# Patient Record
Sex: MALE | Race: WHITE | NOT HISPANIC OR LATINO | Employment: FULL TIME | URBAN - METROPOLITAN AREA
[De-identification: names, ages, dates, MRNs, and addresses within clinical notes are randomized per-mention and may not be internally consistent; named-entity substitution may affect disease eponyms.]

---

## 2017-02-06 ENCOUNTER — OFFICE VISIT (OUTPATIENT)
Dept: SURGERY | Facility: CLINIC | Age: 52
End: 2017-02-06
Payer: COMMERCIAL

## 2017-02-06 VITALS
SYSTOLIC BLOOD PRESSURE: 120 MMHG | HEART RATE: 77 BPM | BODY MASS INDEX: 30.07 KG/M2 | DIASTOLIC BLOOD PRESSURE: 80 MMHG | WEIGHT: 203 LBS | HEIGHT: 69 IN

## 2017-02-06 DIAGNOSIS — R10.31 RIGHT GROIN PAIN: Primary | ICD-10-CM

## 2017-02-06 PROCEDURE — 99214 OFFICE O/P EST MOD 30 MIN: CPT | Performed by: SURGERY

## 2017-02-06 NOTE — MR AVS SNAPSHOT
"              After Visit Summary   2/6/2017    Chris Smith    MRN: [id number]           Patient Information     Date Of Birth          1965        Visit Information        Provider Department      2/6/2017 2:45 PM Daron Scott MD [facility]       Follow-ups after your visit        Who to contact     If you have questions or need follow up information about today's clinic visit or your schedule please contact [facility] directly at [phone].  Normal or non-critical lab and imaging results will be communicated to you by [email or web address], letter or phone within 4 business days after the clinic has received the results. If you do not hear from us within 7 days, please contact the clinic through [employer] or phone. If you have a critical or abnormal lab result, we will notify you by phone as soon as possible.  Submit refill requests through [employer] or call your pharmacy and they will forward the refill request to us. Please allow 3 business days for your refill to be completed.          Additional Information About Your Visit        MyChart Information     [employer] gives you secure access to your electronic health record. If you see a primary care provider, you can also send messages to your care team and make appointments. If you have questions, please call your primary care clinic.  If you do not have a primary care provider, please call [phone] and they will assist you.        Care EveryWhere ID     This is your Care EveryWhere ID. This could be used by other organizations to access your [facility] medical records  [id number]        Your Vitals Were     Pulse Height BMI (Body Mass Index)             77 5' 9\" (1.753 m) 29.96 kg/m2          Blood Pressure from Last 3 Encounters:   02/06/17 120/80   01/18/16 109/62   12/23/15 138/90    Weight from Last 3 Encounters:   02/06/17 203 lb (92.08 kg)   01/18/16 227 lb 1.6 oz " (103.012 kg)   12/23/15 220 lb (99.791 kg)              Today, you had the following     No orders found for display       Primary Care Provider Office Phone # Fax #    Jay Malik [phone]       [address] [address]        Thank you!     Thank you for choosing [facility]  for your care. Our goal is always to provide you with excellent care. Hearing back from our patients is one way we can continue to improve our services. Please take a few minutes to complete the written survey that you may receive in the mail after your visit with us. Thank you!             Your Updated Medication List - Protect others around you: Learn how to safely use, store and throw away your medicines at www.disposemymeds.org.          This list is accurate as of: 2/6/17  3:04 PM.  Always use your most recent med list.                   Brand Name Dispense Instructions for use    CLARITIN PO      Take 10 mg by mouth daily       GLUCOSAMINE SULFATE PO      Take 1,500 mg by mouth daily       IBUPROFEN PO      Take 800 mg by mouth every 8 hours as needed for moderate pain       MULTIVITAMIN PO

## 2017-02-07 NOTE — PROGRESS NOTES
Patient presents today to discuss right-sided groin pain.  He is known to me from prior surgery for chronic groin pain related to a previous left inguinal hernia repair.  He had previously undergone laparoscopic triple neurectomy with mesh explantation and redo laparoscopic hernia repair. When he was last seen in the office in approximately last maybe we had discussed additional physical therapy which he never did. Through his own workout routine he was eventually able to resume normal activity and was completely pain-free. His activity significantly increased including weightlifting, playing hockey, riding bicycle that he was pain-free.  He lost weight and overall states that he felt better than he had in years.  Proximally two weeks ago he awoke in the morning and had sharp discomfort in his right groin.  He described this as being like someone scratching him with a wire brush.  This was quite severe for several days. This radiated across to his pubis and to a lesser degree in his left groin.  He basically ceased all activity.  This has since improved and is quite minimal at this time.  There is no radiating pain into his testicles.  There is no radiating pain into the legs. His bowel habits have been normal.    The office he was examined.  There is no evidence for recurrent hernia.    At this time I have no further recommendations.  I'm hopeful that this was a simple flareup although given his history I am somewhat pessimistic.  This was expressed to him.  I recommended a gentle reintroduction of activity.  Should he have ongoing or worsening symptoms I would recommend pain clinic evaluation. This was all expressed to him in detail.  All of his questions were answered.  Total time spent was 30 minutes with greater than 50% in face-to-face consultation.    Please route or send letter to:  Primary Care Provider (PCP)      RITESH AYON      Physical Exam

## 2018-07-02 ENCOUNTER — RECORDS - HEALTHEAST (OUTPATIENT)
Dept: LAB | Facility: CLINIC | Age: 53
End: 2018-07-02

## 2018-07-02 LAB
ANION GAP SERPL CALCULATED.3IONS-SCNC: 12 MMOL/L (ref 5–18)
BUN SERPL-MCNC: 25 MG/DL (ref 8–22)
CALCIUM SERPL-MCNC: 9.3 MG/DL (ref 8.5–10.5)
CHLORIDE BLD-SCNC: 106 MMOL/L (ref 98–107)
CHOLEST SERPL-MCNC: 223 MG/DL
CO2 SERPL-SCNC: 20 MMOL/L (ref 22–31)
CREAT SERPL-MCNC: 1.02 MG/DL (ref 0.7–1.3)
FASTING STATUS PATIENT QL REPORTED: ABNORMAL
GFR SERPL CREATININE-BSD FRML MDRD: >60 ML/MIN/1.73M2
GLUCOSE BLD-MCNC: 87 MG/DL (ref 70–125)
HDLC SERPL-MCNC: 47 MG/DL
LDLC SERPL CALC-MCNC: 129 MG/DL
POTASSIUM BLD-SCNC: ABNORMAL MMOL/L (ref 3.5–5)
PSA SERPL-MCNC: 1.2 NG/ML (ref 0–3.5)
SODIUM SERPL-SCNC: 138 MMOL/L (ref 136–145)
TRIGL SERPL-MCNC: 237 MG/DL

## 2019-08-14 ENCOUNTER — SURGERY - HEALTHEAST (OUTPATIENT)
Dept: SURGERY | Facility: HOSPITAL | Age: 54
End: 2019-08-14

## 2019-08-14 ENCOUNTER — ANESTHESIA - HEALTHEAST (OUTPATIENT)
Dept: SURGERY | Facility: HOSPITAL | Age: 54
End: 2019-08-14

## 2019-08-14 ASSESSMENT — MIFFLIN-ST. JEOR
SCORE: 1746.1

## 2019-08-15 ASSESSMENT — MIFFLIN-ST. JEOR: SCORE: 1783.36

## 2019-08-16 ASSESSMENT — MIFFLIN-ST. JEOR: SCORE: 1817.83

## 2019-08-19 ENCOUNTER — HOME CARE/HOSPICE - HEALTHEAST (OUTPATIENT)
Dept: HOME HEALTH SERVICES | Facility: HOME HEALTH | Age: 54
End: 2019-08-19

## 2019-08-19 ASSESSMENT — MIFFLIN-ST. JEOR: SCORE: 1785.62

## 2019-08-20 ENCOUNTER — AMBULATORY - HEALTHEAST (OUTPATIENT)
Dept: SURGERY | Facility: CLINIC | Age: 54
End: 2019-08-20

## 2019-08-20 ENCOUNTER — COMMUNICATION - HEALTHEAST (OUTPATIENT)
Dept: SURGERY | Facility: CLINIC | Age: 54
End: 2019-08-20

## 2019-08-21 ENCOUNTER — HOME CARE/HOSPICE - HEALTHEAST (OUTPATIENT)
Dept: HOME HEALTH SERVICES | Facility: HOME HEALTH | Age: 54
End: 2019-08-21

## 2019-08-22 ENCOUNTER — HOME CARE/HOSPICE - HEALTHEAST (OUTPATIENT)
Dept: HOME HEALTH SERVICES | Facility: HOME HEALTH | Age: 54
End: 2019-08-22

## 2019-08-23 ENCOUNTER — RECORDS - HEALTHEAST (OUTPATIENT)
Dept: ADMINISTRATIVE | Facility: OTHER | Age: 54
End: 2019-08-23

## 2019-08-23 ENCOUNTER — HOME CARE/HOSPICE - HEALTHEAST (OUTPATIENT)
Dept: HOME HEALTH SERVICES | Facility: HOME HEALTH | Age: 54
End: 2019-08-23

## 2019-08-23 ENCOUNTER — COMMUNICATION - HEALTHEAST (OUTPATIENT)
Dept: INFECTIOUS DISEASES | Facility: CLINIC | Age: 54
End: 2019-08-23

## 2019-08-24 ENCOUNTER — HOME CARE/HOSPICE - HEALTHEAST (OUTPATIENT)
Dept: HOME HEALTH SERVICES | Facility: HOME HEALTH | Age: 54
End: 2019-08-24

## 2019-08-25 ENCOUNTER — HOME CARE/HOSPICE - HEALTHEAST (OUTPATIENT)
Dept: HOME HEALTH SERVICES | Facility: HOME HEALTH | Age: 54
End: 2019-08-25

## 2019-08-26 ENCOUNTER — OFFICE VISIT - HEALTHEAST (OUTPATIENT)
Dept: SURGERY | Facility: CLINIC | Age: 54
End: 2019-08-26

## 2019-08-26 DIAGNOSIS — Z48.89 POSTOPERATIVE VISIT: ICD-10-CM

## 2019-08-26 ASSESSMENT — MIFFLIN-ST. JEOR: SCORE: 1749.79

## 2019-08-27 ENCOUNTER — HOME CARE/HOSPICE - HEALTHEAST (OUTPATIENT)
Dept: HOME HEALTH SERVICES | Facility: HOME HEALTH | Age: 54
End: 2019-08-27

## 2019-08-28 ENCOUNTER — HOME CARE/HOSPICE - HEALTHEAST (OUTPATIENT)
Dept: HOME HEALTH SERVICES | Facility: HOME HEALTH | Age: 54
End: 2019-08-28

## 2019-08-29 ENCOUNTER — HOME CARE/HOSPICE - HEALTHEAST (OUTPATIENT)
Dept: HOME HEALTH SERVICES | Facility: HOME HEALTH | Age: 54
End: 2019-08-29

## 2019-08-30 ENCOUNTER — HOME CARE/HOSPICE - HEALTHEAST (OUTPATIENT)
Dept: HOME HEALTH SERVICES | Facility: HOME HEALTH | Age: 54
End: 2019-08-30

## 2019-08-31 ENCOUNTER — HOME CARE/HOSPICE - HEALTHEAST (OUTPATIENT)
Dept: HOME HEALTH SERVICES | Facility: HOME HEALTH | Age: 54
End: 2019-08-31

## 2019-09-01 ENCOUNTER — HOME CARE/HOSPICE - HEALTHEAST (OUTPATIENT)
Dept: HOME HEALTH SERVICES | Facility: HOME HEALTH | Age: 54
End: 2019-09-01

## 2019-09-02 ENCOUNTER — HOME CARE/HOSPICE - HEALTHEAST (OUTPATIENT)
Dept: HOME HEALTH SERVICES | Facility: HOME HEALTH | Age: 54
End: 2019-09-02

## 2019-09-03 ENCOUNTER — OFFICE VISIT - HEALTHEAST (OUTPATIENT)
Dept: SURGERY | Facility: CLINIC | Age: 54
End: 2019-09-03

## 2019-09-04 ENCOUNTER — HOME CARE/HOSPICE - HEALTHEAST (OUTPATIENT)
Dept: HOME HEALTH SERVICES | Facility: HOME HEALTH | Age: 54
End: 2019-09-04

## 2019-09-05 ENCOUNTER — HOME CARE/HOSPICE - HEALTHEAST (OUTPATIENT)
Dept: HOME HEALTH SERVICES | Facility: HOME HEALTH | Age: 54
End: 2019-09-05

## 2019-09-06 ENCOUNTER — HOME CARE/HOSPICE - HEALTHEAST (OUTPATIENT)
Dept: HOME HEALTH SERVICES | Facility: HOME HEALTH | Age: 54
End: 2019-09-06

## 2019-09-07 ENCOUNTER — HOME CARE/HOSPICE - HEALTHEAST (OUTPATIENT)
Dept: HOME HEALTH SERVICES | Facility: HOME HEALTH | Age: 54
End: 2019-09-07

## 2019-09-08 ENCOUNTER — HOME CARE/HOSPICE - HEALTHEAST (OUTPATIENT)
Dept: HOME HEALTH SERVICES | Facility: HOME HEALTH | Age: 54
End: 2019-09-08

## 2019-09-09 ENCOUNTER — HOME CARE/HOSPICE - HEALTHEAST (OUTPATIENT)
Dept: HOME HEALTH SERVICES | Facility: HOME HEALTH | Age: 54
End: 2019-09-09

## 2019-09-10 ENCOUNTER — HOME CARE/HOSPICE - HEALTHEAST (OUTPATIENT)
Dept: HOME HEALTH SERVICES | Facility: HOME HEALTH | Age: 54
End: 2019-09-10

## 2019-09-11 ENCOUNTER — HOME CARE/HOSPICE - HEALTHEAST (OUTPATIENT)
Dept: HOME HEALTH SERVICES | Facility: HOME HEALTH | Age: 54
End: 2019-09-11

## 2019-09-12 ENCOUNTER — HOME CARE/HOSPICE - HEALTHEAST (OUTPATIENT)
Dept: HOME HEALTH SERVICES | Facility: HOME HEALTH | Age: 54
End: 2019-09-12

## 2019-09-13 ENCOUNTER — HOME CARE/HOSPICE - HEALTHEAST (OUTPATIENT)
Dept: HOME HEALTH SERVICES | Facility: HOME HEALTH | Age: 54
End: 2019-09-13

## 2019-09-14 ENCOUNTER — HOME CARE/HOSPICE - HEALTHEAST (OUTPATIENT)
Dept: HOME HEALTH SERVICES | Facility: HOME HEALTH | Age: 54
End: 2019-09-14

## 2019-09-15 ENCOUNTER — HOME CARE/HOSPICE - HEALTHEAST (OUTPATIENT)
Dept: HOME HEALTH SERVICES | Facility: HOME HEALTH | Age: 54
End: 2019-09-15

## 2019-09-16 ENCOUNTER — HOME CARE/HOSPICE - HEALTHEAST (OUTPATIENT)
Dept: HOME HEALTH SERVICES | Facility: HOME HEALTH | Age: 54
End: 2019-09-16

## 2019-09-17 ENCOUNTER — HOME CARE/HOSPICE - HEALTHEAST (OUTPATIENT)
Dept: HOME HEALTH SERVICES | Facility: HOME HEALTH | Age: 54
End: 2019-09-17

## 2019-09-17 ENCOUNTER — OFFICE VISIT - HEALTHEAST (OUTPATIENT)
Dept: SURGERY | Facility: CLINIC | Age: 54
End: 2019-09-17

## 2019-09-17 DIAGNOSIS — Z48.89 POSTOPERATIVE VISIT: ICD-10-CM

## 2019-09-18 ENCOUNTER — HOME CARE/HOSPICE - HEALTHEAST (OUTPATIENT)
Dept: HOME HEALTH SERVICES | Facility: HOME HEALTH | Age: 54
End: 2019-09-18

## 2019-09-19 ENCOUNTER — HOME CARE/HOSPICE - HEALTHEAST (OUTPATIENT)
Dept: HOME HEALTH SERVICES | Facility: HOME HEALTH | Age: 54
End: 2019-09-19

## 2019-09-20 ENCOUNTER — HOME CARE/HOSPICE - HEALTHEAST (OUTPATIENT)
Dept: HOME HEALTH SERVICES | Facility: HOME HEALTH | Age: 54
End: 2019-09-20

## 2019-09-21 ENCOUNTER — HOME CARE/HOSPICE - HEALTHEAST (OUTPATIENT)
Dept: HOME HEALTH SERVICES | Facility: HOME HEALTH | Age: 54
End: 2019-09-21

## 2019-09-22 ENCOUNTER — HOME CARE/HOSPICE - HEALTHEAST (OUTPATIENT)
Dept: HOME HEALTH SERVICES | Facility: HOME HEALTH | Age: 54
End: 2019-09-22

## 2019-09-23 ENCOUNTER — HOME CARE/HOSPICE - HEALTHEAST (OUTPATIENT)
Dept: HOME HEALTH SERVICES | Facility: HOME HEALTH | Age: 54
End: 2019-09-23

## 2019-09-24 ENCOUNTER — HOME CARE/HOSPICE - HEALTHEAST (OUTPATIENT)
Dept: HOME HEALTH SERVICES | Facility: HOME HEALTH | Age: 54
End: 2019-09-24

## 2019-09-25 ENCOUNTER — HOME CARE/HOSPICE - HEALTHEAST (OUTPATIENT)
Dept: HOME HEALTH SERVICES | Facility: HOME HEALTH | Age: 54
End: 2019-09-25

## 2019-09-26 ENCOUNTER — HOME CARE/HOSPICE - HEALTHEAST (OUTPATIENT)
Dept: HOME HEALTH SERVICES | Facility: HOME HEALTH | Age: 54
End: 2019-09-26

## 2019-09-27 ENCOUNTER — HOME CARE/HOSPICE - HEALTHEAST (OUTPATIENT)
Dept: HOME HEALTH SERVICES | Facility: HOME HEALTH | Age: 54
End: 2019-09-27

## 2019-09-28 ENCOUNTER — HOME CARE/HOSPICE - HEALTHEAST (OUTPATIENT)
Dept: HOME HEALTH SERVICES | Facility: HOME HEALTH | Age: 54
End: 2019-09-28

## 2019-09-29 ENCOUNTER — HOME CARE/HOSPICE - HEALTHEAST (OUTPATIENT)
Dept: HOME HEALTH SERVICES | Facility: HOME HEALTH | Age: 54
End: 2019-09-29

## 2019-09-30 ENCOUNTER — HOME CARE/HOSPICE - HEALTHEAST (OUTPATIENT)
Dept: HOME HEALTH SERVICES | Facility: HOME HEALTH | Age: 54
End: 2019-09-30

## 2019-10-01 ENCOUNTER — OFFICE VISIT - HEALTHEAST (OUTPATIENT)
Dept: SURGERY | Facility: CLINIC | Age: 54
End: 2019-10-01

## 2019-10-01 DIAGNOSIS — Z48.89 POSTOPERATIVE VISIT: ICD-10-CM

## 2019-10-02 ENCOUNTER — COMMUNICATION - HEALTHEAST (OUTPATIENT)
Dept: SURGERY | Facility: CLINIC | Age: 54
End: 2019-10-02

## 2019-10-04 ENCOUNTER — HOME CARE/HOSPICE - HEALTHEAST (OUTPATIENT)
Dept: HOME HEALTH SERVICES | Facility: HOME HEALTH | Age: 54
End: 2019-10-04

## 2019-10-06 ENCOUNTER — HOME CARE/HOSPICE - HEALTHEAST (OUTPATIENT)
Dept: HOME HEALTH SERVICES | Facility: HOME HEALTH | Age: 54
End: 2019-10-06

## 2019-10-08 ENCOUNTER — OFFICE VISIT - HEALTHEAST (OUTPATIENT)
Dept: SURGERY | Facility: CLINIC | Age: 54
End: 2019-10-08

## 2019-10-08 DIAGNOSIS — Z48.89 POSTOPERATIVE VISIT: ICD-10-CM

## 2019-10-10 ENCOUNTER — HOME CARE/HOSPICE - HEALTHEAST (OUTPATIENT)
Dept: HOME HEALTH SERVICES | Facility: HOME HEALTH | Age: 54
End: 2019-10-10

## 2019-10-22 ENCOUNTER — OFFICE VISIT - HEALTHEAST (OUTPATIENT)
Dept: SURGERY | Facility: CLINIC | Age: 54
End: 2019-10-22

## 2019-10-22 DIAGNOSIS — Z48.89 POSTOPERATIVE VISIT: ICD-10-CM

## 2019-11-04 ENCOUNTER — OFFICE VISIT - HEALTHEAST (OUTPATIENT)
Dept: SURGERY | Facility: CLINIC | Age: 54
End: 2019-11-04

## 2019-11-04 ENCOUNTER — HEALTH MAINTENANCE LETTER (OUTPATIENT)
Age: 54
End: 2019-11-04

## 2019-11-04 DIAGNOSIS — Z48.89 POSTOPERATIVE VISIT: ICD-10-CM

## 2019-11-04 ASSESSMENT — MIFFLIN-ST. JEOR: SCORE: 1777.01

## 2019-11-22 ENCOUNTER — OFFICE VISIT - HEALTHEAST (OUTPATIENT)
Dept: SURGERY | Facility: CLINIC | Age: 54
End: 2019-11-22

## 2019-11-22 DIAGNOSIS — M72.6 NECROTIZING FASCIITIS (H): ICD-10-CM

## 2019-11-22 ASSESSMENT — MIFFLIN-ST. JEOR: SCORE: 1777.01

## 2020-11-22 ENCOUNTER — HEALTH MAINTENANCE LETTER (OUTPATIENT)
Age: 55
End: 2020-11-22

## 2021-05-25 ENCOUNTER — RECORDS - HEALTHEAST (OUTPATIENT)
Dept: ADMINISTRATIVE | Facility: CLINIC | Age: 56
End: 2021-05-25

## 2021-05-26 VITALS
DIASTOLIC BLOOD PRESSURE: 70 MMHG | HEART RATE: 73 BPM | OXYGEN SATURATION: 99 % | TEMPERATURE: 98.7 F | OXYGEN SATURATION: 99 % | SYSTOLIC BLOOD PRESSURE: 112 MMHG | HEART RATE: 73 BPM | SYSTOLIC BLOOD PRESSURE: 116 MMHG | TEMPERATURE: 99.5 F | DIASTOLIC BLOOD PRESSURE: 72 MMHG

## 2021-05-26 VITALS
SYSTOLIC BLOOD PRESSURE: 118 MMHG | DIASTOLIC BLOOD PRESSURE: 70 MMHG | SYSTOLIC BLOOD PRESSURE: 120 MMHG | DIASTOLIC BLOOD PRESSURE: 78 MMHG | OXYGEN SATURATION: 99 % | TEMPERATURE: 98.6 F | OXYGEN SATURATION: 98 % | HEART RATE: 73 BPM | RESPIRATION RATE: 16 BRPM | TEMPERATURE: 98.2 F | HEART RATE: 70 BPM

## 2021-05-26 VITALS
TEMPERATURE: 98.6 F | OXYGEN SATURATION: 98 % | HEART RATE: 74 BPM | DIASTOLIC BLOOD PRESSURE: 80 MMHG | SYSTOLIC BLOOD PRESSURE: 118 MMHG

## 2021-05-26 VITALS
RESPIRATION RATE: 18 BRPM | HEART RATE: 77 BPM | TEMPERATURE: 98.3 F | OXYGEN SATURATION: 98 % | SYSTOLIC BLOOD PRESSURE: 118 MMHG | DIASTOLIC BLOOD PRESSURE: 62 MMHG

## 2021-05-26 VITALS
TEMPERATURE: 98.4 F | OXYGEN SATURATION: 98 % | DIASTOLIC BLOOD PRESSURE: 60 MMHG | HEART RATE: 70 BPM | SYSTOLIC BLOOD PRESSURE: 118 MMHG

## 2021-05-26 VITALS
DIASTOLIC BLOOD PRESSURE: 60 MMHG | HEART RATE: 70 BPM | SYSTOLIC BLOOD PRESSURE: 112 MMHG | TEMPERATURE: 98.5 F | OXYGEN SATURATION: 98 %

## 2021-05-26 VITALS
TEMPERATURE: 98.4 F | OXYGEN SATURATION: 98 % | SYSTOLIC BLOOD PRESSURE: 112 MMHG | DIASTOLIC BLOOD PRESSURE: 62 MMHG | HEART RATE: 68 BPM

## 2021-05-26 VITALS
TEMPERATURE: 98.2 F | DIASTOLIC BLOOD PRESSURE: 70 MMHG | HEART RATE: 74 BPM | OXYGEN SATURATION: 98 % | RESPIRATION RATE: 14 BRPM | SYSTOLIC BLOOD PRESSURE: 124 MMHG

## 2021-05-26 VITALS
TEMPERATURE: 98 F | HEART RATE: 73 BPM | DIASTOLIC BLOOD PRESSURE: 74 MMHG | SYSTOLIC BLOOD PRESSURE: 121 MMHG | OXYGEN SATURATION: 98 %

## 2021-05-26 VITALS
HEART RATE: 69 BPM | OXYGEN SATURATION: 98 % | TEMPERATURE: 98.4 F | SYSTOLIC BLOOD PRESSURE: 132 MMHG | DIASTOLIC BLOOD PRESSURE: 80 MMHG

## 2021-05-26 VITALS
HEART RATE: 77 BPM | TEMPERATURE: 98.7 F | OXYGEN SATURATION: 98 % | DIASTOLIC BLOOD PRESSURE: 78 MMHG | SYSTOLIC BLOOD PRESSURE: 121 MMHG

## 2021-05-26 VITALS
DIASTOLIC BLOOD PRESSURE: 72 MMHG | OXYGEN SATURATION: 99 % | TEMPERATURE: 98.4 F | HEART RATE: 61 BPM | SYSTOLIC BLOOD PRESSURE: 124 MMHG

## 2021-05-26 VITALS
TEMPERATURE: 98.3 F | DIASTOLIC BLOOD PRESSURE: 74 MMHG | SYSTOLIC BLOOD PRESSURE: 119 MMHG | OXYGEN SATURATION: 99 % | OXYGEN SATURATION: 98 % | HEART RATE: 76 BPM | SYSTOLIC BLOOD PRESSURE: 122 MMHG | TEMPERATURE: 98.9 F | OXYGEN SATURATION: 99 % | HEART RATE: 70 BPM | DIASTOLIC BLOOD PRESSURE: 72 MMHG | HEART RATE: 73 BPM | TEMPERATURE: 97.9 F | DIASTOLIC BLOOD PRESSURE: 70 MMHG | SYSTOLIC BLOOD PRESSURE: 115 MMHG

## 2021-05-26 VITALS
HEART RATE: 74 BPM | SYSTOLIC BLOOD PRESSURE: 120 MMHG | TEMPERATURE: 98.2 F | DIASTOLIC BLOOD PRESSURE: 70 MMHG | OXYGEN SATURATION: 99 %

## 2021-05-26 VITALS
TEMPERATURE: 99 F | DIASTOLIC BLOOD PRESSURE: 72 MMHG | SYSTOLIC BLOOD PRESSURE: 118 MMHG | OXYGEN SATURATION: 99 % | HEART RATE: 70 BPM

## 2021-05-26 VITALS
DIASTOLIC BLOOD PRESSURE: 70 MMHG | TEMPERATURE: 98.7 F | SYSTOLIC BLOOD PRESSURE: 120 MMHG | HEART RATE: 69 BPM | OXYGEN SATURATION: 97 %

## 2021-05-26 VITALS
SYSTOLIC BLOOD PRESSURE: 130 MMHG | DIASTOLIC BLOOD PRESSURE: 64 MMHG | RESPIRATION RATE: 16 BRPM | HEART RATE: 74 BPM | HEART RATE: 80 BPM | RESPIRATION RATE: 16 BRPM | OXYGEN SATURATION: 99 % | DIASTOLIC BLOOD PRESSURE: 74 MMHG | TEMPERATURE: 97.8 F | SYSTOLIC BLOOD PRESSURE: 120 MMHG | TEMPERATURE: 98.5 F | OXYGEN SATURATION: 98 %

## 2021-05-26 VITALS
RESPIRATION RATE: 14 BRPM | SYSTOLIC BLOOD PRESSURE: 122 MMHG | HEART RATE: 68 BPM | TEMPERATURE: 98.2 F | OXYGEN SATURATION: 97 % | DIASTOLIC BLOOD PRESSURE: 80 MMHG

## 2021-05-26 VITALS
TEMPERATURE: 99.2 F | DIASTOLIC BLOOD PRESSURE: 72 MMHG | OXYGEN SATURATION: 100 % | SYSTOLIC BLOOD PRESSURE: 118 MMHG | HEART RATE: 80 BPM

## 2021-05-26 VITALS
DIASTOLIC BLOOD PRESSURE: 80 MMHG | TEMPERATURE: 98.4 F | HEART RATE: 88 BPM | SYSTOLIC BLOOD PRESSURE: 124 MMHG | OXYGEN SATURATION: 98 %

## 2021-05-26 VITALS
TEMPERATURE: 98.4 F | HEART RATE: 70 BPM | DIASTOLIC BLOOD PRESSURE: 62 MMHG | SYSTOLIC BLOOD PRESSURE: 108 MMHG | OXYGEN SATURATION: 98 %

## 2021-05-26 VITALS
OXYGEN SATURATION: 98 % | HEART RATE: 77 BPM | DIASTOLIC BLOOD PRESSURE: 68 MMHG | TEMPERATURE: 99.1 F | SYSTOLIC BLOOD PRESSURE: 120 MMHG

## 2021-05-26 VITALS
TEMPERATURE: 98.7 F | HEART RATE: 75 BPM | SYSTOLIC BLOOD PRESSURE: 118 MMHG | OXYGEN SATURATION: 99 % | DIASTOLIC BLOOD PRESSURE: 70 MMHG

## 2021-05-26 VITALS
RESPIRATION RATE: 16 BRPM | DIASTOLIC BLOOD PRESSURE: 80 MMHG | OXYGEN SATURATION: 97 % | HEART RATE: 78 BPM | SYSTOLIC BLOOD PRESSURE: 118 MMHG | TEMPERATURE: 97.8 F

## 2021-05-26 VITALS
OXYGEN SATURATION: 98 % | HEART RATE: 79 BPM | SYSTOLIC BLOOD PRESSURE: 122 MMHG | TEMPERATURE: 98.8 F | DIASTOLIC BLOOD PRESSURE: 62 MMHG

## 2021-05-26 VITALS
DIASTOLIC BLOOD PRESSURE: 76 MMHG | OXYGEN SATURATION: 98 % | HEART RATE: 66 BPM | SYSTOLIC BLOOD PRESSURE: 124 MMHG | TEMPERATURE: 98.4 F

## 2021-05-27 ENCOUNTER — RECORDS - HEALTHEAST (OUTPATIENT)
Dept: ADMINISTRATIVE | Facility: CLINIC | Age: 56
End: 2021-05-27

## 2021-05-28 ENCOUNTER — RECORDS - HEALTHEAST (OUTPATIENT)
Dept: ADMINISTRATIVE | Facility: CLINIC | Age: 56
End: 2021-05-28

## 2021-05-29 ENCOUNTER — RECORDS - HEALTHEAST (OUTPATIENT)
Dept: ADMINISTRATIVE | Facility: CLINIC | Age: 56
End: 2021-05-29

## 2021-05-30 ENCOUNTER — RECORDS - HEALTHEAST (OUTPATIENT)
Dept: ADMINISTRATIVE | Facility: CLINIC | Age: 56
End: 2021-05-30

## 2021-05-31 NOTE — ANESTHESIA CARE TRANSFER NOTE
Last vitals:   Vitals:    08/14/19 2155   BP: 134/85   Pulse: 97   Resp: 16   Temp: 37.4  C (99.4  F)   SpO2: 99%     Patient's level of consciousness is drowsy  Spontaneous respirations: yes  Maintains airway independently: yes  Dentition unchanged: yes  Oropharynx: oropharynx clear of all foreign objects    QCDR Measures:  ASA# 20 - Surgical Safety Checklist: WHO surgical safety checklist completed prior to induction    PQRS# 430 - Adult PONV Prevention: 4558F - Pt received => 2 anti-emetic agents (different classes) preop & intraop  ASA# 8 - Peds PONV Prevention: NA - Not pediatric patient, not GA or 2 or more risk factors NOT present  PQRS# 424 - Carolin-op Temp Management: 4559F - At least one body temp DOCUMENTED => 35.5C or 95.9F within required timeframe  PQRS# 426 - PACU Transfer Protocol: - Transfer of care checklist used  ASA# 14 - Acute Post-op Pain: ASA14B - Patient did NOT experience pain >= 7 out of 10

## 2021-05-31 NOTE — ANESTHESIA POSTPROCEDURE EVALUATION
Patient: Chris ATKINS Dhooge  debridement  right groin   Anesthesia type: general    Patient location: PACU  Last vitals:   Vitals Value Taken Time   /69 8/14/2019 10:40 PM   Temp 37.2  C (98.9  F) 8/14/2019 10:30 PM   Pulse 98 8/14/2019 10:40 PM   Resp 14 8/14/2019 10:40 PM   SpO2 95 % 8/14/2019 10:40 PM     Post vital signs: stable  Level of consciousness: awake and responds to simple questions  Post-anesthesia pain: pain controlled  Post-anesthesia nausea and vomiting: no  Pulmonary: unassisted, return to baseline  Cardiovascular: stable and blood pressure at baseline  Hydration: adequate  Anesthetic events: no    QCDR Measures:  ASA# 11 - Carolin-op Cardiac Arrest: ASA11B - Patient did NOT experience unanticipated cardiac arrest  ASA# 12 - Carolin-op Mortality Rate: ASA12B - Patient did NOT die  ASA# 13 - PACU Re-Intubation Rate: ASA13B - Patient did NOT require a new airway mgmt  ASA# 10 - Composite Anes Safety: ASA10A - No serious adverse event    Additional Notes:

## 2021-05-31 NOTE — PROGRESS NOTES
"HPI: Pt is here for follow up debridement of the right groin secondary to necrotizing fasciitis with Dr. Goldberg on 8/14/2049.   he is doing well.  Pain is well controlled and he primarily only needs pain medication prior to dressing changes.  Home care has been packing the wound daily. he is eating well and denies fever and chills.         /64 (Patient Site: Right Arm, Patient Position: Sitting, Cuff Size: Adult Large)   Pulse 85   Ht 5' 9\" (1.753 m)   Wt 206 lb (93.4 kg)   SpO2 99%   BMI 30.42 kg/m      EXAM:  GENERAL:Appears well  SURGICAL WOUNDS:  Right groin wound that measures 7 cm x 3.5 cm with no tunneling noted and 100% granulation tissue at the base of the wound      Assessment/Plan: Continue with daily wet to dry packing of wounds and follow up with us next week.    Amina Hanna , [facility]       "

## 2021-05-31 NOTE — ANESTHESIA PREPROCEDURE EVALUATION
Anesthesia Evaluation      Patient summary reviewed     Airway   Mallampati: II   Pulmonary - negative ROS and normal exam                          Cardiovascular - negative ROS and normal exam   Neuro/Psych - negative ROS     Endo/Other    (+) obesity,      Comments: Groin abscess    GI/Hepatic/Renal - negative ROS      Other findings: Hb 14.8, K 4.0      Dental - normal exam                        Anesthesia Plan  Planned anesthetic: general endotracheal    ASA 2   Induction: intravenous   Anesthetic plan and risks discussed with: patient    Post-op plan: routine recovery

## 2021-05-31 NOTE — PROGRESS NOTES
Patient seen in clinic for dressing change.   Wound  appearance dull pink, no odor, minimal serosanguinous drainage.  Packed saline gauze (2 x 2's) into wound, covered with abd pad as directed.   Sent patient home with supplies.  Patient will call to schedule additional dressing changes if needed, after meeting with home care.

## 2021-05-31 NOTE — TELEPHONE ENCOUNTER
Pt called today to discuss his wound care plan. He is s/p debridement of right groin on 8/14/19. Pt was discharged from the hospital today and would like to set up wound care. He states that his insurance will only pay for home care twice weekly. Pt also reports that he lives alone and due to the area, is unable to pack himself.    Dr. Goldberg has ordered saline guaze packing two times a day and to follow up with him in 2 weeks. Pt was scheduled for a wound packing this afternoon. Will have to discuss possible wound teaching for pt to complete one packing himself if able. I have also offered pt to return to our clinic for nurse wound packing visits since he lives close by. He was pleased with this option.

## 2021-06-01 NOTE — PROGRESS NOTES
HPI:  Pt is here for follow up debridement of the right groin secondary to necrotizing fasciitis with Dr. Goldberg on 8/14/2049. Home health has been caring for wound with wet to dry dressings. Feels still very tender.       /70 (Patient Site: Right Arm, Patient Position: Sitting, Cuff Size: Adult Large)   Pulse 98   Wt 208 lb (94.3 kg)   SpO2 99%   BMI 30.72 kg/m      EXAM:  GENERAL:Appears well  Wound has granulated past skin edges and has heaped over skin. I used silver nitrate over wound.       Assessment/Plan: . Keep wound clean and dry. Cover with mepilex bandages and change every couple of days. Follow up in one week to have rechecked.     Pancho Millard PA-C  [facility] Department of Surgery;o

## 2021-06-01 NOTE — PROGRESS NOTES
HPI:  Pt is here for follow up debridement of the right groin secondary to necrotizing fasciitis with Dr. Goldberg on 8/14/2049.   he is doing well.  Pain is well controlled and he primarily only needs pain medication prior to dressing changes.  Home care has been packing the wound daily. he is eating well and denies fever and chills.          /70 (Patient Site: Right Arm, Patient Position: Sitting, Cuff Size: Adult Small)   Pulse 84   Wt 205 lb (93 kg)   SpO2 97%   BMI 30.27 kg/m      EXAM:  GENERAL:Appears well    SURGICAL WOUNDS: granular tissue to level of skin.   Redressed with moistened 2 by 2.       Assessment/Plan: . Doing well after surgery and should follow up in two weeks. Continue daily dressing changes. Doing very well  Pancho Millard PA-C  [facility] Department of Surgery

## 2021-06-01 NOTE — PROGRESS NOTES
HPI:Pt is here for follow up debridement of the right groin secondary to necrotizing fasciitis with Dr. Goldberg on 8/14/2049..   he is doing well.  Pain is well controlled.  No difficulties with the surgical wound/wounds.  he is eating well and denies fever and chills.         /64 (Patient Site: Right Arm, Patient Position: Sitting, Cuff Size: Adult Small)   Pulse 82   Wt 208 lb (94.3 kg)   SpO2 98%   BMI 30.72 kg/m      EXAM:  GENERAL:Appears well    SURGICAL WOUNDS: wound getting smaller. Still granulated and looks good.         Assessment/Plan: . Doing well after surgery and should follow up in two weeks. Continue wound cares with home care nurse    Pancho Millard PA-C  [facility] Department of Surgery

## 2021-06-02 NOTE — PROGRESS NOTES
"HPI:  Pt is here for follow up wound check after incision and debridement of necrotizing fasciitis by Dr. Goldberg on 8/14/2019. The wound has closed, but was noted to have hypergranulation which was treated with silver nitrate debridements the last two visits. Patient reports that the area is still very tender and it impedes how he is sitting and walking. He occasionally notes some \"bloody drainage\" on his gauze. He denies fever, chills, nausea, vomiting, and foul smelling drainage. Patient is very concerned about having to work and states that he does not want to return to work while he remains in pain. He is not using any narcotic pain medications and occasionally uses tylenol or advil but not very often.      /70 (Patient Site: Right Arm, Patient Position: Sitting, Cuff Size: Adult Regular)   Pulse 92   Wt 212 lb (96.2 kg)   SpO2 100%   BMI 31.31 kg/m      EXAM:  GENERAL:Appears well  WOUNDS: Incision is approximated with hypergranulation tissue noted protruding from the skin surface about 0.5 cm and  extends about 2 cm down the length of the incision; the granulation tissue is very sensitive to touch; The area is not indurated, no fluctuance, no erythema or hyperthermia.      Assessment/Plan: Wound s/p I&D that has closed but with tender hypergranulation tissue that is not responding to silver nitrate treatments. We will have patient see Dr. Goldberg to see if he wants to mechanically debride this area. Due to the tenderness, it would be a good idea for him to stay out of work.    Amina Hanna , [facility]       "

## 2021-06-02 NOTE — PROGRESS NOTES
HPI: Pt is here for follow up wound check after incision and debridement of necrotizing fasciitis by Dr. Goldberg on 8/14/2019.  Patient has been doing fine, but notices that the area stays moist and is worried that it smells.  No fever, chills nausea or vomiting.      /68 (Patient Site: Right Arm, Patient Position: Sitting, Cuff Size: Adult Small)   Pulse 91   Wt 209 lb (94.8 kg)   SpO2 99%   BMI 30.86 kg/m      EXAM:  GENERAL:Appears well  WOUNDS: Incision is approximated with a small amount of overgrowth of granulation tissue centrally; there is a Mepilex dressing in place that I remove and moisture is noted under the dressing.  No purulent drainage or signs of infection.  The area is not indurated, no fluctuance, no erythema or hyperthermia.  Silver nitrate stick applied to granulation tissue that has overgrown the skin and area is covered with dry gauze.      Assessment/Plan: Patient has been using Mepilex dressings which are occlusive and do not allow air to the area.  Keeping this area moist and warm is a breeding ground for bacteria and yeast.  At this point with the wound closed, and actually over grown with granulation tissue, he is instructed to keep it dry and nothing should go over it except dry gauze which can be held in place with his underpants.  We can recheck the wound in 2 weeks to ensure this is dried up and healed.    Amina Hanna , [facility] Surgery

## 2021-06-03 VITALS
HEART RATE: 85 BPM | HEIGHT: 69 IN | OXYGEN SATURATION: 99 % | SYSTOLIC BLOOD PRESSURE: 130 MMHG | WEIGHT: 212 LBS | BODY MASS INDEX: 31.4 KG/M2 | DIASTOLIC BLOOD PRESSURE: 84 MMHG

## 2021-06-03 VITALS
OXYGEN SATURATION: 99 % | BODY MASS INDEX: 30.86 KG/M2 | SYSTOLIC BLOOD PRESSURE: 118 MMHG | WEIGHT: 209 LBS | DIASTOLIC BLOOD PRESSURE: 68 MMHG | HEART RATE: 91 BPM

## 2021-06-03 VITALS
WEIGHT: 208 LBS | BODY MASS INDEX: 30.72 KG/M2 | DIASTOLIC BLOOD PRESSURE: 64 MMHG | HEART RATE: 82 BPM | SYSTOLIC BLOOD PRESSURE: 120 MMHG | OXYGEN SATURATION: 98 %

## 2021-06-03 VITALS
DIASTOLIC BLOOD PRESSURE: 70 MMHG | HEART RATE: 92 BPM | OXYGEN SATURATION: 100 % | BODY MASS INDEX: 31.31 KG/M2 | WEIGHT: 212 LBS | SYSTOLIC BLOOD PRESSURE: 128 MMHG

## 2021-06-03 VITALS — WEIGHT: 206 LBS | BODY MASS INDEX: 30.51 KG/M2 | HEIGHT: 69 IN

## 2021-06-03 VITALS
BODY MASS INDEX: 30.72 KG/M2 | HEART RATE: 98 BPM | DIASTOLIC BLOOD PRESSURE: 70 MMHG | OXYGEN SATURATION: 99 % | SYSTOLIC BLOOD PRESSURE: 118 MMHG | WEIGHT: 208 LBS

## 2021-06-03 VITALS
OXYGEN SATURATION: 97 % | SYSTOLIC BLOOD PRESSURE: 118 MMHG | WEIGHT: 205 LBS | HEART RATE: 84 BPM | BODY MASS INDEX: 30.27 KG/M2 | DIASTOLIC BLOOD PRESSURE: 70 MMHG

## 2021-06-03 VITALS — HEIGHT: 69 IN | BODY MASS INDEX: 31.68 KG/M2 | WEIGHT: 213.9 LBS

## 2021-06-03 NOTE — PROGRESS NOTES
[facility] Surgery Follow up    HPI:    54 y.o. year old male who returns for a follow up.  Follow-up of necrotizing fasciitis of his right groin was debrided and is undergone dressing changes.    Allergies:Bee pollen and Mite extract    Past Medical History:   Diagnosis Date     Diverticulosis      Inguinal hernia      Pilonidal cyst        Past Surgical History:   Procedure Laterality Date     HIP ARTHROSCOPY W/ LABRAL REPAIR Right      INGUINAL HERNIA REPAIR      x3 bilaterally     DE EXCISION THROMBOSED HEMORRHOID, EXTERNAL Right 8/14/2019    Procedure: debridement  right groin ;  Surgeon: Shad Goldberg MD;  Location: [facility] [place];  Service: General     TOTAL HIP ARTHROPLASTY Right 2013     WISDOM TOOTH EXTRACTION         CURRENT MEDS:  Current Outpatient Medications on File Prior to Visit   Medication Sig Dispense Refill     acetaminophen (TYLENOL) 325 MG tablet Take 650 mg by mouth every 4 (four) hours. Alternate with ibuprofen       aspirin 81 mg chewable tablet Chew 81 mg daily.       diphenhydrAMINE (BENADRYL) 25 mg capsule Take 25 mg by mouth at bedtime.       glucos-msm-collagen-C-Mn-hrb21 (GLUCOSAMINE-MSM COMPLEX) 500-333-5 mg cap LW Addl Instr:INDICATED FOR OSTEOARTHRITIS.       ibuprofen (ADVIL,MOTRIN) 400 MG tablet Take 400 mg by mouth every 4 (four) hours. Alternate with tylenol       loratadine (CLARITIN) 10 mg tablet Take 10 mg by mouth daily.       No current facility-administered medications on file prior to visit.        Family History   Problem Relation Age of Onset     No Medical Problems Mother      No Medical Problems Father         reports that he has never smoked. He has never used smokeless tobacco. He reports current alcohol use. He reports that he does not use drugs.    Review of Systems:  Negative except necrotizing fasciitis infection with open wound    OBJECTIVE:  Vitals:    11/04/19 1119   BP: 130/84   Patient Site: Right Arm   Patient Position: Sitting   Cuff Size: Adult  "Large   Pulse: 85   SpO2: 99%   Weight: 212 lb (96.2 kg)   Height: 5' 9\" (1.753 m)     Body mass index is 31.31 kg/m .    EXAM:  GENERAL: This is a well-developed 54 y.o. male who appears his stated age  HEAD: normocephalic  HEENT: Pupils equal and reactive bilaterally  CARDIAC: RRR without murmur  CHEST/LUNG:  Clear to auscultation  ABDOMEN: Soft, nontender, nondistended, no masses   Right groin almost completely healed. Small area of hypertrophied mucosa exposed   NEUROLOGIC: Focally intact, nonfocal  VASCULAR: Pulses intact, symmetrical upper and lower extremities.        LABS:  Lab Results   Component Value Date    WBC 8.2 08/18/2019    HGB 11.6 (L) 08/18/2019    HCT 35.2 (L) 08/18/2019    MCV 91 08/18/2019     08/18/2019     INR/Prothrombin Time      No results found for: HGBA1C  Lab Results   Component Value Date    ALT 92 (H) 08/18/2019    AST 70 (H) 08/18/2019    ALKPHOS 81 08/18/2019    BILITOT 0.2 08/18/2019        Assessment/Plan:   Status post I&D for necrotizing fasciitis right groin  Still has an area of kind of the mucosal exposure where he has some drainage.  Today this was silver nitrate applied.  Bacitracin gauze dressing  I think this should seal down in the next few weeks time.  If it does not in the next month he should come back and see me     Shad Goldberg MD  [facility] Department of Surgery  "

## 2021-06-04 VITALS
HEIGHT: 69 IN | OXYGEN SATURATION: 96 % | RESPIRATION RATE: 14 BRPM | BODY MASS INDEX: 31.4 KG/M2 | DIASTOLIC BLOOD PRESSURE: 70 MMHG | SYSTOLIC BLOOD PRESSURE: 122 MMHG | WEIGHT: 212 LBS | HEART RATE: 94 BPM

## 2021-06-04 NOTE — PROGRESS NOTES
"[facility] Surgery Follow up    HPI:    54 y.o. year old male who returns for a follow up. Wound from necrotizing fascitis.     Allergies:Bee pollen and Mite extract    Past Medical History:   Diagnosis Date     Diverticulosis      Inguinal hernia      Pilonidal cyst        Past Surgical History:   Procedure Laterality Date     HIP ARTHROSCOPY W/ LABRAL REPAIR Right      INGUINAL HERNIA REPAIR      x3 bilaterally     MI EXCISION THROMBOSED HEMORRHOID, EXTERNAL Right 8/14/2019    Procedure: debridement  right groin ;  Surgeon: Shad Goldberg MD;  Location: [facility] [place];  Service: General     TOTAL HIP ARTHROPLASTY Right 2013     WISDOM TOOTH EXTRACTION         CURRENT MEDS:  Current Outpatient Medications on File Prior to Visit   Medication Sig Dispense Refill     acetaminophen (TYLENOL) 325 MG tablet Take 650 mg by mouth every 4 (four) hours. Alternate with ibuprofen       aspirin 81 mg chewable tablet Chew 81 mg daily.       diphenhydrAMINE (BENADRYL) 25 mg capsule Take 25 mg by mouth at bedtime.       glucos-msm-collagen-C-Mn-hrb21 (GLUCOSAMINE-MSM COMPLEX) 500-333-5 mg cap LW Addl Instr:INDICATED FOR OSTEOARTHRITIS.       ibuprofen (ADVIL,MOTRIN) 400 MG tablet Take 400 mg by mouth every 4 (four) hours. Alternate with tylenol       loratadine (CLARITIN) 10 mg tablet Take 10 mg by mouth daily.       No current facility-administered medications on file prior to visit.        Family History   Problem Relation Age of Onset     No Medical Problems Mother      No Medical Problems Father         reports that he has never smoked. He has never used smokeless tobacco. He reports current alcohol use. He reports that he does not use drugs.    Review of Systems:  Negative except wound small spot    OBJECTIVE:  Vitals:    11/22/19 1332   BP: 122/70   Pulse: 94   Resp: 14   SpO2: 96%   Weight: 212 lb (96.2 kg)   Height: 5' 9\" (1.753 m)     Body mass index is 31.31 kg/m .    EXAM:  GENERAL: This is a well-developed 54 " y.o. male who appears his stated age  HEAD: normocephalic  HEENT: Pupils equal and reactive bilaterally  CARDIAC: RRR without murmur  CHEST/LUNG:  Clear to auscultation  ABDOMEN: Soft, nontender, nondistended, no masses  Groin small mm spot of tissuestill open  NEUROLOGIC: Focally intact, nonfocal  VASCULAR: Pulses intact, symmetrical upper and lower extremities.    Procedure consent obtained  Silver nitrate to open spot. Guaze dressing    LABS:  Lab Results   Component Value Date    WBC 8.2 08/18/2019    HGB 11.6 (L) 08/18/2019    HCT 35.2 (L) 08/18/2019    MCV 91 08/18/2019     08/18/2019     INR/Prothrombin Time      No results found for: HGBA1C  Lab Results   Component Value Date    ALT 92 (H) 08/18/2019    AST 70 (H) 08/18/2019    ALKPHOS 81 08/18/2019    BILITOT 0.2 08/18/2019        Assessment/Plan:   Silver nitrate   Wound should close in the next few days to week. If it doesn't he should come back and see me.        Shad Goldberg MD  [facility] Department of Surgery

## 2021-06-19 NOTE — LETTER
Letter by Francisca Reyes CMA at      Author: Francisca Reyes CMA Service: -- Author Type: --    Filed:  Encounter Date: 10/2/2019 Status: Signed         October 2, 2019     Patient: Chris Smith   YOB: 1965   Date of Visit: 10/2/2019       To Whom It May Concern:    It is my medical opinion that Chris Smith should remain out of work until Monday, October 28th. He still needs time to recuperate and he is having pain and is not fully healed yet. Restrictions are still in place, no heaving lifting over 20 lbs. He will be in for another visit on Tuesday, October 8th and will be reassessed then.     If you have any questions or concerns, please don't hesitate to call.    Sincerely,        Electronically signed by Shad Goldberg MD

## 2021-09-19 ENCOUNTER — HEALTH MAINTENANCE LETTER (OUTPATIENT)
Age: 56
End: 2021-09-19

## 2022-01-08 ENCOUNTER — HEALTH MAINTENANCE LETTER (OUTPATIENT)
Age: 57
End: 2022-01-08

## 2022-07-25 ENCOUNTER — LAB REQUISITION (OUTPATIENT)
Dept: LAB | Facility: CLINIC | Age: 57
End: 2022-07-25

## 2022-07-25 DIAGNOSIS — Z12.5 ENCOUNTER FOR SCREENING FOR MALIGNANT NEOPLASM OF PROSTATE: ICD-10-CM

## 2022-07-25 DIAGNOSIS — Z13.6 ENCOUNTER FOR SCREENING FOR CARDIOVASCULAR DISORDERS: ICD-10-CM

## 2022-07-25 DIAGNOSIS — K59.00 CONSTIPATION, UNSPECIFIED: ICD-10-CM

## 2022-07-25 DIAGNOSIS — R03.0 ELEVATED BLOOD-PRESSURE READING, WITHOUT DIAGNOSIS OF HYPERTENSION: ICD-10-CM

## 2022-07-25 LAB
ANION GAP SERPL CALCULATED.3IONS-SCNC: 14 MMOL/L (ref 7–15)
BUN SERPL-MCNC: 16.8 MG/DL (ref 6–20)
CALCIUM SERPL-MCNC: 9.4 MG/DL (ref 8.6–10)
CHLORIDE SERPL-SCNC: 102 MMOL/L (ref 98–107)
CHOLEST SERPL-MCNC: 193 MG/DL
CREAT SERPL-MCNC: 0.93 MG/DL (ref 0.67–1.17)
DEPRECATED HCO3 PLAS-SCNC: 25 MMOL/L (ref 22–29)
GFR SERPL CREATININE-BSD FRML MDRD: >90 ML/MIN/1.73M2
GLUCOSE SERPL-MCNC: 83 MG/DL (ref 70–99)
HDLC SERPL-MCNC: 46 MG/DL
LDLC SERPL CALC-MCNC: 112 MG/DL
NONHDLC SERPL-MCNC: 147 MG/DL
POTASSIUM SERPL-SCNC: 4.1 MMOL/L (ref 3.4–5.3)
PSA SERPL-MCNC: 1.24 NG/ML (ref 0–3.5)
SODIUM SERPL-SCNC: 141 MMOL/L (ref 136–145)
TRIGL SERPL-MCNC: 175 MG/DL

## 2022-07-25 PROCEDURE — 80048 BASIC METABOLIC PNL TOTAL CA: CPT | Performed by: FAMILY MEDICINE

## 2022-07-25 PROCEDURE — 82784 ASSAY IGA/IGD/IGG/IGM EACH: CPT | Performed by: FAMILY MEDICINE

## 2022-07-25 PROCEDURE — 80061 LIPID PANEL: CPT | Performed by: FAMILY MEDICINE

## 2022-07-25 PROCEDURE — G0103 PSA SCREENING: HCPCS | Performed by: FAMILY MEDICINE

## 2022-07-25 PROCEDURE — 83516 IMMUNOASSAY NONANTIBODY: CPT | Performed by: FAMILY MEDICINE

## 2022-07-28 LAB
GLIADIN IGA SER-ACNC: 1.2 U/ML
GLIADIN IGG SER-ACNC: 2.2 U/ML
IGA SERPL-MCNC: 106 MG/DL (ref 84–499)
TTG IGA SER-ACNC: 0.4 U/ML
TTG IGG SER-ACNC: 0.9 U/ML

## 2022-11-20 ENCOUNTER — HEALTH MAINTENANCE LETTER (OUTPATIENT)
Age: 57
End: 2022-11-20

## 2023-04-15 ENCOUNTER — HEALTH MAINTENANCE LETTER (OUTPATIENT)
Age: 58
End: 2023-04-15

## 2023-08-11 ENCOUNTER — LAB REQUISITION (OUTPATIENT)
Dept: LAB | Facility: CLINIC | Age: 58
End: 2023-08-11

## 2023-08-11 DIAGNOSIS — R03.0 ELEVATED BLOOD-PRESSURE READING, WITHOUT DIAGNOSIS OF HYPERTENSION: ICD-10-CM

## 2023-08-11 LAB
ANION GAP SERPL CALCULATED.3IONS-SCNC: 13 MMOL/L (ref 7–15)
BUN SERPL-MCNC: 13.5 MG/DL (ref 8–23)
CALCIUM SERPL-MCNC: 9.3 MG/DL (ref 8.6–10)
CHLORIDE SERPL-SCNC: 105 MMOL/L (ref 98–107)
CHOLEST SERPL-MCNC: 198 MG/DL
CREAT SERPL-MCNC: 0.89 MG/DL (ref 0.67–1.17)
DEPRECATED HCO3 PLAS-SCNC: 25 MMOL/L (ref 22–29)
GFR SERPL CREATININE-BSD FRML MDRD: >90 ML/MIN/1.73M2
GLUCOSE SERPL-MCNC: 67 MG/DL (ref 70–99)
HDLC SERPL-MCNC: 45 MG/DL
LDLC SERPL CALC-MCNC: 133 MG/DL
NONHDLC SERPL-MCNC: 153 MG/DL
POTASSIUM SERPL-SCNC: 4.5 MMOL/L (ref 3.4–5.3)
PSA SERPL DL<=0.01 NG/ML-MCNC: 1.18 NG/ML (ref 0–3.5)
SODIUM SERPL-SCNC: 143 MMOL/L (ref 136–145)
TRIGL SERPL-MCNC: 99 MG/DL

## 2023-08-11 PROCEDURE — 82310 ASSAY OF CALCIUM: CPT | Performed by: FAMILY MEDICINE

## 2023-08-11 PROCEDURE — G0103 PSA SCREENING: HCPCS | Performed by: FAMILY MEDICINE

## 2023-08-11 PROCEDURE — 80061 LIPID PANEL: CPT | Performed by: FAMILY MEDICINE

## 2023-08-25 ENCOUNTER — HOSPITAL ENCOUNTER (OUTPATIENT)
Dept: CARDIOLOGY | Facility: HOSPITAL | Age: 58
Discharge: HOME | End: 2023-08-25
Attending: FAMILY MEDICINE | Admitting: FAMILY MEDICINE
Payer: COMMERCIAL

## 2023-08-25 DIAGNOSIS — R00.2 PALPITATIONS: ICD-10-CM

## 2023-08-25 PROCEDURE — 93226 XTRNL ECG REC<48 HR SCAN A/R: CPT

## 2023-08-31 PROCEDURE — 93227 XTRNL ECG REC<48 HR R&I: CPT | Performed by: INTERNAL MEDICINE

## 2024-06-22 ENCOUNTER — HEALTH MAINTENANCE LETTER (OUTPATIENT)
Age: 59
End: 2024-06-22

## 2024-09-13 ENCOUNTER — LAB REQUISITION (OUTPATIENT)
Dept: LAB | Facility: CLINIC | Age: 59
End: 2024-09-13

## 2024-09-13 DIAGNOSIS — E78.2 MIXED HYPERLIPIDEMIA: ICD-10-CM

## 2024-09-13 DIAGNOSIS — Z12.5 ENCOUNTER FOR SCREENING FOR MALIGNANT NEOPLASM OF PROSTATE: ICD-10-CM

## 2024-09-13 PROCEDURE — 84520 ASSAY OF UREA NITROGEN: CPT | Performed by: FAMILY MEDICINE

## 2024-09-13 PROCEDURE — G0103 PSA SCREENING: HCPCS | Performed by: FAMILY MEDICINE

## 2024-09-13 PROCEDURE — 80048 BASIC METABOLIC PNL TOTAL CA: CPT | Performed by: FAMILY MEDICINE

## 2024-09-13 PROCEDURE — 80061 LIPID PANEL: CPT | Performed by: FAMILY MEDICINE

## 2024-09-14 LAB
ANION GAP SERPL CALCULATED.3IONS-SCNC: 13 MMOL/L (ref 7–15)
BUN SERPL-MCNC: 14.4 MG/DL (ref 8–23)
CALCIUM SERPL-MCNC: 9.1 MG/DL (ref 8.8–10.4)
CHLORIDE SERPL-SCNC: 103 MMOL/L (ref 98–107)
CHOLEST SERPL-MCNC: 205 MG/DL
CREAT SERPL-MCNC: 0.89 MG/DL (ref 0.67–1.17)
EGFRCR SERPLBLD CKD-EPI 2021: >90 ML/MIN/1.73M2
FASTING STATUS PATIENT QL REPORTED: ABNORMAL
FASTING STATUS PATIENT QL REPORTED: NORMAL
GLUCOSE SERPL-MCNC: 89 MG/DL (ref 70–99)
HCO3 SERPL-SCNC: 22 MMOL/L (ref 22–29)
HDLC SERPL-MCNC: 48 MG/DL
LDLC SERPL CALC-MCNC: 129 MG/DL
NONHDLC SERPL-MCNC: 157 MG/DL
POTASSIUM SERPL-SCNC: 4.1 MMOL/L (ref 3.4–5.3)
PSA SERPL DL<=0.01 NG/ML-MCNC: 1.18 NG/ML (ref 0–4.5)
SODIUM SERPL-SCNC: 138 MMOL/L (ref 135–145)
TRIGL SERPL-MCNC: 141 MG/DL

## 2024-10-03 ENCOUNTER — LAB REQUISITION (OUTPATIENT)
Dept: LAB | Facility: CLINIC | Age: 59
End: 2024-10-03

## 2024-10-03 DIAGNOSIS — E78.2 MIXED HYPERLIPIDEMIA: ICD-10-CM

## 2024-10-03 LAB
CHOLEST SERPL-MCNC: 216 MG/DL
FASTING STATUS PATIENT QL REPORTED: ABNORMAL
HDLC SERPL-MCNC: 47 MG/DL
LDLC SERPL CALC-MCNC: 142 MG/DL
NONHDLC SERPL-MCNC: 169 MG/DL
TRIGL SERPL-MCNC: 133 MG/DL

## 2024-10-03 PROCEDURE — 80061 LIPID PANEL: CPT | Performed by: FAMILY MEDICINE

## 2025-01-20 ENCOUNTER — LAB REQUISITION (OUTPATIENT)
Dept: LAB | Facility: CLINIC | Age: 60
End: 2025-01-20

## 2025-01-20 DIAGNOSIS — E78.2 MIXED HYPERLIPIDEMIA: ICD-10-CM

## 2025-01-20 LAB
ALBUMIN SERPL BCG-MCNC: 4.2 G/DL (ref 3.5–5.2)
ALP SERPL-CCNC: 82 U/L (ref 40–150)
ALT SERPL W P-5'-P-CCNC: 75 U/L (ref 0–70)
ANION GAP SERPL CALCULATED.3IONS-SCNC: 10 MMOL/L (ref 7–15)
AST SERPL W P-5'-P-CCNC: 40 U/L (ref 0–45)
BILIRUB SERPL-MCNC: 0.5 MG/DL
BUN SERPL-MCNC: 13.2 MG/DL (ref 8–23)
CALCIUM SERPL-MCNC: 9.1 MG/DL (ref 8.8–10.4)
CHLORIDE SERPL-SCNC: 103 MMOL/L (ref 98–107)
CHOLEST SERPL-MCNC: 141 MG/DL
CREAT SERPL-MCNC: 0.84 MG/DL (ref 0.67–1.17)
EGFRCR SERPLBLD CKD-EPI 2021: >90 ML/MIN/1.73M2
FASTING STATUS PATIENT QL REPORTED: ABNORMAL
FASTING STATUS PATIENT QL REPORTED: NORMAL
GLUCOSE SERPL-MCNC: 107 MG/DL (ref 70–99)
HCO3 SERPL-SCNC: 25 MMOL/L (ref 22–29)
HDLC SERPL-MCNC: 46 MG/DL
LDLC SERPL CALC-MCNC: 83 MG/DL
NONHDLC SERPL-MCNC: 95 MG/DL
POTASSIUM SERPL-SCNC: 4.2 MMOL/L (ref 3.4–5.3)
PROT SERPL-MCNC: 7 G/DL (ref 6.4–8.3)
SODIUM SERPL-SCNC: 138 MMOL/L (ref 135–145)
TRIGL SERPL-MCNC: 62 MG/DL

## 2025-01-20 PROCEDURE — 80053 COMPREHEN METABOLIC PANEL: CPT | Performed by: FAMILY MEDICINE

## 2025-01-20 PROCEDURE — 80061 LIPID PANEL: CPT | Performed by: FAMILY MEDICINE

## 2025-07-12 ENCOUNTER — HEALTH MAINTENANCE LETTER (OUTPATIENT)
Age: 60
End: 2025-07-12